# Patient Record
Sex: MALE | Race: WHITE | Employment: FULL TIME | URBAN - METROPOLITAN AREA
[De-identification: names, ages, dates, MRNs, and addresses within clinical notes are randomized per-mention and may not be internally consistent; named-entity substitution may affect disease eponyms.]

---

## 2017-02-10 ENCOUNTER — OFFICE VISIT (OUTPATIENT)
Dept: OCCUPATIONAL MEDICINE | Facility: CLINIC | Age: 72
End: 2017-02-10
Payer: OTHER MISCELLANEOUS

## 2017-02-10 PROCEDURE — 99203 OFFICE O/P NEW LOW 30 MIN: CPT

## 2022-01-05 ENCOUNTER — OFFICE VISIT (OUTPATIENT)
Dept: GASTROENTEROLOGY | Facility: CLINIC | Age: 77
End: 2022-01-05
Payer: MEDICARE

## 2022-01-05 VITALS
BODY MASS INDEX: 29.63 KG/M2 | DIASTOLIC BLOOD PRESSURE: 70 MMHG | SYSTOLIC BLOOD PRESSURE: 122 MMHG | HEIGHT: 70 IN | WEIGHT: 207 LBS | HEART RATE: 90 BPM

## 2022-01-05 DIAGNOSIS — Z12.11 SCREENING FOR COLON CANCER: Primary | ICD-10-CM

## 2022-01-05 DIAGNOSIS — Z71.89 ENCOUNTER FOR ANTICOAGULATION DISCUSSION AND COUNSELING: ICD-10-CM

## 2022-01-05 PROCEDURE — 99203 OFFICE O/P NEW LOW 30 MIN: CPT | Performed by: REGISTERED NURSE

## 2022-01-05 RX ORDER — CLOPIDOGREL BISULFATE 75 MG/1
75 TABLET ORAL DAILY
COMMUNITY

## 2022-01-05 RX ORDER — ATORVASTATIN CALCIUM 80 MG/1
80 TABLET, FILM COATED ORAL DAILY
COMMUNITY

## 2022-01-05 RX ORDER — METOPROLOL SUCCINATE 25 MG/1
25 TABLET, EXTENDED RELEASE ORAL DAILY
COMMUNITY

## 2022-01-05 NOTE — PROGRESS NOTES
[id number] - Outpatient Consultation  Savanna Saurez 77 y o  male MRN: [id number]  Encounter: [id number]    ASSESSMENT AND PLAN:      1  Screening for colon cancer  Last colonoscopy greater than 15 years ago  No polyps at that time  He is in doing Cologuard every 2-3 years since  2021 positive Cologuard which is why he is presenting for colonoscopy    2  Encounter for anticoagulation discussion and counseling  Patient is on Plavix for history of an MI  No stents placed  He will need to hold the medication 7 days prior to procedure  Patient understands the risks and benefits of holding medication including the risk of a small thromboembolic event and agrees to proceed given the approval by Dr López many      Followup Appointment: PRN  ______________________________________________________________________    Chief Complaint   Patient presents with    Positive cologuard, clearance for colonoscopy  Pt on Plavix       HPI:   Savanna Suarez is a 77y o  year old male who presents for clearance for colonoscopy  He is on Plavix for history of an MI 3 years ago  No stents placed at that time  No further heart attack, denies CVA, he is not diabetic, no sleep apnea  He denies any GI complaints  He moves his bowel regularly 1-2 times daily  Denies any blood  No abdominal pain  His appetite is good  No significant weight loss  No nausea, vomiting, GERD symptoms, dysphagia      Historical Information   Past Medical History:   Diagnosis Date    Heart attack ([address] )     Hyperlipidemia     Hypertension      Past Surgical History:   Procedure Laterality Date    ANKLE FRACTURE SURGERY      CATARACT EXTRACTION, BILATERAL      COLONOSCOPY      HERNIA REPAIR      ROTATOR CUFF REPAIR Bilateral      Social History     Substance and Sexual Activity   Alcohol Use Not Currently     Social History     Substance and Sexual Activity   Drug Use Not on file     Social History Tobacco Use   Smoking Status Never Smoker   Smokeless Tobacco Never Used     Family History   Problem Relation Age of Onset    Colon polyps Neg Hx     Colon cancer Neg Hx        Meds/Allergies     Current Outpatient Medications:     atorvastatin (LIPITOR) 80 mg tablet    clopidogrel (PLAVIX) 75 mg tablet    metoprolol succinate (TOPROL-XL) 25 mg 24 hr tablet    Probiotic Product (PROBIOTIC DAILY PO)    No Known Allergies    PHYSICAL EXAM:    Blood pressure 122/70, pulse 90, height 5' 10" (1 778 m), weight 93 9 kg (207 lb)  Body mass index is 29 7 kg/m²  General Appearance: NAD, cooperative, alert  Eyes: Anicteric, PERRLA, EOMI  ENT:  Normocephalic, atraumatic, normal mucosa  Neck:  Supple, symmetrical, trachea midline,   Resp:  Clear to auscultation bilaterally; no rales, rhonchi or wheezing; respirations unlabored   CV:  S1 S2, Regular rate and rhythm; no murmur, rub, or gallop  GI:  Soft, non-tender, non-distended; normal bowel sounds; no masses, no organomegaly   Rectal: Deferred  Musculoskeletal: No cyanosis, clubbing or edema  Normal ROM  Skin:  No jaundice, rashes, or lesions   Heme/Lymph: No palpable cervical lymphadenopathy  Psych: Normal affect, good eye contact  Neuro: No gross deficits, AAOx3    Lab Results:   No results found for: WBC, HGB, HCT, MCV, PLT  No results found for: NA, K, CL, CO2, ANIONGAP, BUN, CREATININE, GLUCOSE, GLUF, CALCIUM, CORRECTEDCA, AST, ALT, ALKPHOS, PROT, BILITOT, EGFR  No results found for: IRON, TIBC, FERRITIN  No results found for: LIPASE    Radiology Results:   No results found  REVIEW OF SYSTEMS:    CONSTITUTIONAL: Denies any fever, chills, rigors, and weight loss  HEENT: No earache or tinnitus  Denies hearing loss or visual disturbances  CARDIOVASCULAR: No chest pain or palpitations  RESPIRATORY: Denies any cough, hemoptysis, shortness of breath or dyspnea on exertion  GASTROINTESTINAL: As noted in the History of Present Illness     GENITOURINARY: No problems with urination  Denies any hematuria or dysuria  NEUROLOGIC: No dizziness or vertigo, denies headaches  MUSCULOSKELETAL: Denies any muscle or joint pain  SKIN: Denies skin rashes or itching  ENDOCRINE: Denies excessive thirst  Denies intolerance to heat or cold  PSYCHOSOCIAL: Denies depression or anxiety  Denies any recent memory loss

## 2022-01-05 NOTE — PATIENT INSTRUCTIONS
Scheduled date of colonoscopy (as of today): 1/25/22  Physician performing colonoscopy: Dr Robin Moreno  Location of colonoscopy: [facility]  Bowel prep reviewed with patient: Miralax  Instructions reviewed with patient by: Orion Muller  Clearances: none

## 2022-01-09 ENCOUNTER — PREP FOR PROCEDURE (OUTPATIENT)
Dept: GASTROENTEROLOGY | Facility: CLINIC | Age: 77
End: 2022-01-09

## 2022-01-09 DIAGNOSIS — Z12.11 SCREENING FOR COLON CANCER: Primary | ICD-10-CM

## 2022-01-10 ENCOUNTER — DOCUMENTATION (OUTPATIENT)
Dept: GASTROENTEROLOGY | Facility: CLINIC | Age: 77
End: 2022-01-10

## 2022-01-12 NOTE — PROGRESS NOTES
Advised patient to stop Plavix 7 days prior to procedure  Last dose will be 1/17/2022  Patient verbalized understanding

## 2022-01-25 ENCOUNTER — HOSPITAL ENCOUNTER (OUTPATIENT)
Dept: GASTROENTEROLOGY | Facility: AMBULATORY SURGERY CENTER | Age: 77
Discharge: HOME | End: 2022-01-25
Payer: MEDICARE

## 2022-01-25 ENCOUNTER — ANESTHESIA EVENT (OUTPATIENT)
Dept: GASTROENTEROLOGY | Facility: AMBULATORY SURGERY CENTER | Age: 77
End: 2022-01-25

## 2022-01-25 ENCOUNTER — ANESTHESIA (OUTPATIENT)
Dept: GASTROENTEROLOGY | Facility: AMBULATORY SURGERY CENTER | Age: 77
End: 2022-01-25

## 2022-01-25 VITALS
RESPIRATION RATE: 17 BRPM | DIASTOLIC BLOOD PRESSURE: 67 MMHG | TEMPERATURE: 97.1 F | SYSTOLIC BLOOD PRESSURE: 137 MMHG | OXYGEN SATURATION: 94 % | HEART RATE: 68 BPM

## 2022-01-25 DIAGNOSIS — K63.89 COLONIC MASS: Primary | ICD-10-CM

## 2022-01-25 DIAGNOSIS — Z12.11 SCREENING FOR COLON CANCER: ICD-10-CM

## 2022-01-25 PROCEDURE — 45380 COLONOSCOPY AND BIOPSY: CPT | Performed by: INTERNAL MEDICINE

## 2022-01-25 PROCEDURE — 88341 IMHCHEM/IMCYTCHM EA ADD ANTB: CPT | Performed by: PATHOLOGY

## 2022-01-25 PROCEDURE — 88342 IMHCHEM/IMCYTCHM 1ST ANTB: CPT | Performed by: PATHOLOGY

## 2022-01-25 PROCEDURE — 88305 TISSUE EXAM BY PATHOLOGIST: CPT | Performed by: PATHOLOGY

## 2022-01-25 PROCEDURE — 45381 COLONOSCOPY SUBMUCOUS NJX: CPT | Performed by: INTERNAL MEDICINE

## 2022-01-25 RX ORDER — PROPOFOL 10 MG/ML
INJECTION, EMULSION INTRAVENOUS AS NEEDED
Status: DISCONTINUED | OUTPATIENT
Start: 2022-01-25 | End: 2022-01-25

## 2022-01-25 RX ORDER — GLYCOPYRROLATE 0.2 MG/ML
INJECTION INTRAMUSCULAR; INTRAVENOUS AS NEEDED
Status: DISCONTINUED | OUTPATIENT
Start: 2022-01-25 | End: 2022-01-25

## 2022-01-25 RX ORDER — SODIUM CHLORIDE, SODIUM LACTATE, POTASSIUM CHLORIDE, CALCIUM CHLORIDE 600; 310; 30; 20 MG/100ML; MG/100ML; MG/100ML; MG/100ML
50 INJECTION, SOLUTION INTRAVENOUS CONTINUOUS
Status: DISCONTINUED | OUTPATIENT
Start: 2022-01-25 | End: 2022-01-29 | Stop reason: HOSPADM

## 2022-01-25 RX ADMIN — PROPOFOL 50 MG: 10 INJECTION, EMULSION INTRAVENOUS at 13:15

## 2022-01-25 RX ADMIN — PROPOFOL 50 MG: 10 INJECTION, EMULSION INTRAVENOUS at 13:00

## 2022-01-25 RX ADMIN — SODIUM CHLORIDE, SODIUM LACTATE, POTASSIUM CHLORIDE, CALCIUM CHLORIDE 50 ML/HR: 600; 310; 30; 20 INJECTION, SOLUTION INTRAVENOUS at 12:43

## 2022-01-25 RX ADMIN — PROPOFOL 50 MG: 10 INJECTION, EMULSION INTRAVENOUS at 13:06

## 2022-01-25 RX ADMIN — GLYCOPYRROLATE 0.2 MG: 0.2 INJECTION INTRAMUSCULAR; INTRAVENOUS at 13:22

## 2022-01-25 RX ADMIN — PROPOFOL 100 MG: 10 INJECTION, EMULSION INTRAVENOUS at 12:58

## 2022-01-25 NOTE — H&P
History and Physical -  [facility]  Lucinda Hamilton 77 y o  male MRN: [id number]    HPI: Lucinda Hamilton is a 77y o  year old male who presents for colonoscopy for positive Cologuard    REVIEW OF SYSTEMS: Per the HPI, and otherwise unremarkable  Historical Information   Past Medical History:   Diagnosis Date    [address] [facility] [address] [address] )     Hyperlipidemia     Hypertension     Myocardial infarction [facility])      Past Surgical History:   Procedure Laterality Date    ANKLE FRACTURE SURGERY      CATARACT EXTRACTION      CATARACT EXTRACTION, BILATERAL      COLONOSCOPY      HERNIA REPAIR      ROTATOR CUFF REPAIR Bilateral      Social History   Social History     Substance and Sexual Activity   Alcohol Use Not Currently     Social History     Substance and Sexual Activity   Drug Use Never     Social History     Tobacco Use   Smoking Status Never Smoker   Smokeless Tobacco Never Used     Family History   Problem Relation Age of Onset    Colon polyps Neg Hx     Colon cancer Neg Hx        Meds/Allergies       Current Outpatient Medications:     atorvastatin (LIPITOR) 80 mg tablet    clopidogrel (PLAVIX) 75 mg tablet    metoprolol succinate (TOPROL-XL) 25 mg 24 hr tablet    Probiotic Product (PROBIOTIC DAILY PO)    Current Facility-Administered Medications:     lactated ringers infusion, 50 mL/hr, Intravenous, Continuous, Stopped at 01/25/22 1321    No Known Allergies    Objective     /68   Pulse 63   Temp (!) 97 1 °F (36 2 °C) (Temporal)   Resp 17   SpO2 94%     PHYSICAL EXAM    Gen: NAD AAOx3  Head: Normocephalic, Atraumatic  CV: S1S2 RRR no m/r/g  CHEST: Clear b/l no c/r/w  ABD: soft, +BS NT/ND  EXT: no edema    ASSESSMENT/PLAN:  This is a 77y o  year old male here for colonoscopy, and he is stable and optimized for his procedure

## 2022-01-25 NOTE — ANESTHESIA POSTPROCEDURE EVALUATION
Post-Op Assessment Note    CV Status:  Stable  Pain Score: 0    Pain management: adequate     Mental Status:  Alert and awake   Hydration Status:  Euvolemic and stable   PONV Controlled:  None   Airway Patency:  Patent      Post Op Vitals Reviewed: Yes      Staff: CRNA         No complications documented      /80 (01/25/22 1324)    Temp     Pulse 68 (01/25/22 1324)   Resp 18 (01/25/22 1324)    SpO2 95 % (01/25/22 1324)

## 2022-01-25 NOTE — PROGRESS NOTES
Pt c/o 4/10 pain on R side which is constant  Pt repositioned and assisted to BR to see if able to pass air

## 2022-01-25 NOTE — PROGRESS NOTES
Pt's pain improve 2/10 but instructed that if it gets worse to call number given  Dr Juancarlos Holliday spoke with pt and daughter to review results and at bedside to hear d/c instructions  Pt given script for CT scan and name for referring doctor

## 2022-01-25 NOTE — ANESTHESIA PREPROCEDURE EVALUATION
Procedure:  COLONOSCOPY    Relevant Problems   CARDIO   (+) Heart attack (HCC)   (+) Hyperlipidemia   (+) Hypertension        Physical Exam    Airway    Mallampati score: II  TM Distance: >3 FB  Neck ROM: full     Dental   No notable dental hx     Cardiovascular  Cardiovascular exam normal    Pulmonary  Pulmonary exam normal     Other Findings        Anesthesia Plan  ASA Score- 3     Anesthesia Type- IV sedation with anesthesia with ASA Monitors  Additional Monitors:   Airway Plan:           Plan Factors-    Chart reviewed  Patient is not a current smoker  Induction- intravenous  Postoperative Plan-     Informed Consent- Anesthetic plan and risks discussed with patient  I personally reviewed this patient with the CRNA  Discussed and agreed on the Anesthesia Plan with the CRNA  Houston Arechiga

## 2022-01-28 ENCOUNTER — TELEPHONE (OUTPATIENT)
Dept: GASTROENTEROLOGY | Facility: CLINIC | Age: 77
End: 2022-01-28

## 2022-01-28 NOTE — RESULT ENCOUNTER NOTE
Spoke with patient biopsy positive for CA as expected in the colon  He has a CT scan scheduled for next week  Please reach out to him with Dr Noble from [facility] number he understands this needs to be removed surgically  One year colonoscopy recall    thanks

## 2022-01-28 NOTE — TELEPHONE ENCOUNTER
Forwarded:  Sergey Armstrong([facility])   Pt: Bc Bookbinder ( 47) with a colon mass, it is Chema Guillen, pathology

## 2022-02-03 ENCOUNTER — HOSPITAL ENCOUNTER (OUTPATIENT)
Dept: CT IMAGING | Facility: HOSPITAL | Age: 77
Discharge: HOME | End: 2022-02-03
Attending: INTERNAL MEDICINE
Payer: MEDICARE

## 2022-02-03 DIAGNOSIS — K63.89 COLONIC MASS: ICD-10-CM

## 2022-02-03 PROCEDURE — 74177 CT ABD & PELVIS W/CONTRAST: CPT

## 2022-02-03 PROCEDURE — G1004 CDSM NDSC: HCPCS

## 2022-02-03 RX ADMIN — IOHEXOL 100 ML: 350 INJECTION, SOLUTION INTRAVENOUS at 14:45

## 2022-02-03 NOTE — LETTER
[address] [place] [address]  [address] [facility] [address]      February 14, 2022    MRN: [id number]     Phone: [phone]     Dear Mr Tom Patel recently had a(n) Cat Scan performed on 2/3/2022 at  [address] [place] [address] that was requested by Tahira Albert MD  The study was reviewed by a radiologist, which is a physician who specializes in medical imaging  The radiologist issued a report describing his or her findings  In that report there was a finding that the radiologist felt warranted further discussion with your health care provider and that discussion would be beneficial to you  The results were sent to Tahira Albert MD on 02/08/2022 10:42 AM  We recommend that you contact Tahira Albert MD at [phone] or set up an appointment to discuss the results of the imaging test  If you have already heard from Tahira Albert MD regarding the results of your study, you can disregard this letter  This letter is not meant to alarm you, but intended to encourage you to follow-up on your results with the provider that sent you for the imaging study  In addition, we have enclosed answers to frequently asked questions by other patients who have also received a letter to review results with their health care provider (see page two)  Thank you for choosing [address] [place] [address] for your medical imaging needs  FREQUENTLY ASKED QUESTIONS    1  Why am I receiving this letter? [facility] requires us to notify patients who have findings on imaging exams that may require more testing or follow-up with a health professional within the next 3 months          2  How serious is the finding on the imaging test?  This letter is sent to all patients who may need follow-up or more testing within the next 3 months  Receiving this letter does not necessarily mean you have a life-threatening imaging finding or disease  Recommendations in the radiologists imaging report are general in nature and it is up to your healthcare provider to say whether those recommendations make sense for your situation  You are strongly encouraged to talk to your health care provider about the results and ask whether additional steps need to be taken  3  Where can I get a copy of the final report for my recent radiology exam?  To get a full copy of the report you can access your records online at [email or web address] or please contact [address] [place] [address] Records Department at [phone] Monday through Friday between 8 am and 6 pm          4  What do I need to do now? Please contact your health care provider who requested the imaging study to discuss what further actions (if any) are needed  You may have already heard from (your ordering provider) in regard to this test in which case you can disregard this letter  NOTICE IN ACCORDANCE WITH THE [facility] PATIENT TEST RESULT INFORMATION ACT OF 2018    You are receiving this notice as a result of a determination by your diagnostic imaging service that further discussions of your test results are warranted and would be beneficial to you  The complete results of your test or tests have been or will be sent to the health care practitioner that ordered the test or tests  It is recommended that you contact your health care practitioner to discuss your results as soon as possible

## 2022-02-07 NOTE — TELEPHONE ENCOUNTER
Pt called questioning results of his CT scan from 2/3  Noted in system, exam ended  Pt notified it is not completed yet  Pt has an apt with Dr Karena Bowden on 2/9 so requested once completed to please fax to Dr Karena Bowden as well as Dr Darwin James  Called [facility]'s reading room and asked it be finished before 2/9

## 2022-02-08 NOTE — TELEPHONE ENCOUNTER
[facility]   Significant findings for your pt Sofiatenzin Violette" on his CT abdomen pelvis  The report is in [place]

## 2022-02-08 NOTE — RESULT ENCOUNTER NOTE
Spoke with patient    To me this looks negative for metastatic disease has appointment with Dr Niharika Zambrano tomorrow which I encouraged him to keep

## 2022-02-09 ENCOUNTER — TELEPHONE (OUTPATIENT)
Dept: GASTROENTEROLOGY | Facility: CLINIC | Age: 77
End: 2022-02-09

## 2022-02-09 NOTE — TELEPHONE ENCOUNTER
Elli from [facility] for Dr Sharri Gutierrez left  mssg stating Pt is being seen this afternoon; urgent med rec request for records to be sent to [phone]  No CB#  Fax'd records  Confirmation rec'd  Alicia Gomez called again requesting records  Conf'd receipt of previous mssg/stated records were sent and confirmation rec'd  She checked fax/records rec'd

## 2022-12-29 ENCOUNTER — TELEPHONE (OUTPATIENT)
Dept: GASTROENTEROLOGY | Facility: AMBULARY SURGERY CENTER | Age: 77
End: 2022-12-29

## 2022-12-29 NOTE — TELEPHONE ENCOUNTER
12/29/22  Screened by: Ana Santos    Referring Provider     Pre- Screening:     There is no height or weight on file to calculate BMI.  Has patient been referred for a routine screening Colonoscopy? yes  Is the patient between 45-75 years old? yes      Previous Colonoscopy yes   If yes:    Date: 1/2022    [facility]    Reason:       SCHEDULING STAFF: If the patient is between 45yrs-49yrs, please advise patient to confirm benefits/coverage with their insurance company for a routine screening colonoscopy, some insurance carriers will only cover at 50yrs or older. If the patient is over 75years old, please schedule an office visit.     Does the patient want to see a Gastroenterologist prior to their procedure OR are they having any GI symptoms? no    Has the patient been hospitalized or had abdominal surgery in the past 6 months? no    Does the patient use supplemental oxygen? no    Does the patient take Coumadin, Lovenox, Plavix, Elliquis, Xarelto, or other blood thinning medication? yes    Has the patient had a stroke, cardiac event, or stent placed in the past year? no    SCHEDULING STAFF: If patient answers NO to above questions, then schedule procedure. If patient answers YES to above questions, then schedule office appointment.     If patient is between 45yrs - 49yrs, please advise patient that we will have to confirm benefits & coverage with their insurance company for a routine screening colonoscopy.      Pt currently on plavix, states that he was on it when he had the last one, call back # [phone]

## 2023-02-06 ENCOUNTER — TELEPHONE (OUTPATIENT)
Dept: GASTROENTEROLOGY | Facility: CLINIC | Age: 78
End: 2023-02-06

## 2023-02-06 ENCOUNTER — OFFICE VISIT (OUTPATIENT)
Dept: GASTROENTEROLOGY | Facility: CLINIC | Age: 78
End: 2023-02-06

## 2023-02-06 VITALS
SYSTOLIC BLOOD PRESSURE: 136 MMHG | BODY MASS INDEX: 28.72 KG/M2 | HEIGHT: 70 IN | WEIGHT: 200.6 LBS | DIASTOLIC BLOOD PRESSURE: 68 MMHG

## 2023-02-06 DIAGNOSIS — C18.7 MALIGNANT NEOPLASM OF SIGMOID COLON (HCC): ICD-10-CM

## 2023-02-06 DIAGNOSIS — Z85.038 HISTORY OF COLON CANCER: Primary | ICD-10-CM

## 2023-02-06 DIAGNOSIS — Z79.01 LONG TERM CURRENT USE OF ANTICOAGULANT: ICD-10-CM

## 2023-02-06 NOTE — PROGRESS NOTES
[facility] - Outpatient Follow-up Note  Carol Kidd 69 y o  male MRN: [id number]  Encounter: [id number]    ASSESSMENT AND PLAN:      1  History of colon cancer  2  Long term current use of anticoagulant  3  Malignant neoplasm of sigmoid colon [facility])  Patient with a history of A-fib on Eliquis and colon adenocarcinoma T2N0 of the rectosigmoid status post resection with colorectal surgery  Currently due for repeat colonoscopy for history of colon cancer  Patient to hold Eliquis for 2 days prior to his procedure  We will schedule the patient for colonoscopy and have advised the patient to take the bowel preparation in a split dose bowel preparation  I have discussed with the patient the risks and benefits and alternatives of the procedure which include but are not limited to bleeding, infection, aspiration, perforation  Follow up appointment: For colonoscopy  ______________________________________________________________________    Chief Complaint   Patient presents with   • Follow up from colon surgery   • Schedule Colonoscopy     HPI:   Patient is a 71-year-old male past medical history of A-fib on Eliquis, colon cancer status postresection in 2022,  Presenting for follow-up regarding colon cancer history  Last colonoscopy was in January 2022 with rectosigmoid mass that was positive for cancer  Status post resection with colorectal surgery          Historical Information   Past Medical History:   Diagnosis Date   • Heart attack ([facility] [address] )    • Hyperlipidemia    • Hypertension    • Malignant neoplasm of sigmoid colon ([facility] [address] ) 2/6/2023   • Myocardial infarction [facility])      Past Surgical History:   Procedure Laterality Date   • ANKLE FRACTURE SURGERY     • CATARACT EXTRACTION     • CATARACT EXTRACTION, BILATERAL     • COLON SURGERY     • COLONOSCOPY     • HERNIA REPAIR     • ROTATOR CUFF REPAIR Bilateral      Social History     Substance and Sexual Activity   Alcohol Use Not Currently     Social History     Substance and Sexual Activity   Drug Use Never     Social History     Tobacco Use   Smoking Status Never   Smokeless Tobacco Never     Family History   Problem Relation Age of Onset   • Colon polyps Neg Hx    • Colon cancer Neg Hx          Current Outpatient Medications:   •  apixaban (ELIQUIS) 5 mg  •  atorvastatin (LIPITOR) 80 mg tablet  •  metoprolol succinate (TOPROL-XL) 25 mg 24 hr tablet  •  Probiotic Product (PROBIOTIC DAILY PO)  No Known Allergies  Reviewed medications and allergies and updated as indicated    PHYSICAL EXAM:    Blood pressure 136/68, height 5' 10" (1 778 m), weight 91 kg (200 lb 9 6 oz)  Body mass index is 28 78 kg/m²  General Appearance: NAD, cooperative, alert  Eyes: Anicteric  GI:  Soft, non-tender, non-distended; normal bowel sounds; no masses, no organomegaly   Rectal: Deferred  Musculoskeletal: No edema  Skin:  No jaundice    Lab Results:   No results found for: WBC, HGB, MCV, PLT  No results found for: NA, K, CL, CO2, ANIONGAP, BUN, CREATININE, GLUCOSE, GLUF, CALCIUM, CORRECTEDCA, AST, ALT, ALKPHOS, PROT, BILITOT, EGFR  No results found for: IRON, TIBC, FERRITIN  No results found for: LIPASE    Radiology Results:   No results found

## 2023-02-06 NOTE — TELEPHONE ENCOUNTER
Scheduled date of colonoscopy (as of today):3-28 23Physician performing colonoscopy: [address]  Location of colonoscopy: [address] [place] [address]  Bowel prep reviewed with patient: Danae Tom  Instructions reviewed with patient by:ju zamora   Clearances: yes Eliquis

## 2023-03-14 ENCOUNTER — TELEPHONE (OUTPATIENT)
Dept: GASTROENTEROLOGY | Facility: CLINIC | Age: 78
End: 2023-03-14

## 2023-03-14 NOTE — TELEPHONE ENCOUNTER
X-1 message sent to patient re colonoscopy/eliquis hold  Last dose 3/25/2023  Miralax/dulcolax prep

## 2023-03-28 ENCOUNTER — ANESTHESIA EVENT (OUTPATIENT)
Dept: GASTROENTEROLOGY | Facility: AMBULATORY SURGERY CENTER | Age: 78
End: 2023-03-28

## 2023-03-28 ENCOUNTER — HOSPITAL ENCOUNTER (OUTPATIENT)
Dept: GASTROENTEROLOGY | Facility: AMBULATORY SURGERY CENTER | Age: 78
Discharge: HOME | End: 2023-03-28
Attending: INTERNAL MEDICINE

## 2023-03-28 ENCOUNTER — ANESTHESIA (OUTPATIENT)
Dept: GASTROENTEROLOGY | Facility: AMBULATORY SURGERY CENTER | Age: 78
End: 2023-03-28

## 2023-03-28 VITALS
RESPIRATION RATE: 17 BRPM | SYSTOLIC BLOOD PRESSURE: 145 MMHG | TEMPERATURE: 96.1 F | OXYGEN SATURATION: 96 % | BODY MASS INDEX: 28.63 KG/M2 | HEIGHT: 70 IN | HEART RATE: 77 BPM | DIASTOLIC BLOOD PRESSURE: 72 MMHG | WEIGHT: 200 LBS

## 2023-03-28 DIAGNOSIS — Z85.038 HISTORY OF COLON CANCER: ICD-10-CM

## 2023-03-28 RX ORDER — PROPOFOL 10 MG/ML
INJECTION, EMULSION INTRAVENOUS CONTINUOUS PRN
Status: DISCONTINUED | OUTPATIENT
Start: 2023-03-28 | End: 2023-03-28

## 2023-03-28 RX ORDER — PROPOFOL 10 MG/ML
INJECTION, EMULSION INTRAVENOUS AS NEEDED
Status: DISCONTINUED | OUTPATIENT
Start: 2023-03-28 | End: 2023-03-28

## 2023-03-28 RX ORDER — SODIUM CHLORIDE, SODIUM LACTATE, POTASSIUM CHLORIDE, CALCIUM CHLORIDE 600; 310; 30; 20 MG/100ML; MG/100ML; MG/100ML; MG/100ML
50 INJECTION, SOLUTION INTRAVENOUS CONTINUOUS
Status: DISCONTINUED | OUTPATIENT
Start: 2023-03-28 | End: 2023-04-01 | Stop reason: HOSPADM

## 2023-03-28 RX ADMIN — SODIUM CHLORIDE, SODIUM LACTATE, POTASSIUM CHLORIDE, CALCIUM CHLORIDE 50 ML/HR: 600; 310; 30; 20 INJECTION, SOLUTION INTRAVENOUS at 09:11

## 2023-03-28 RX ADMIN — PROPOFOL 100 MG: 10 INJECTION, EMULSION INTRAVENOUS at 09:20

## 2023-03-28 RX ADMIN — PROPOFOL 100 MCG/KG/MIN: 10 INJECTION, EMULSION INTRAVENOUS at 09:20

## 2023-03-28 NOTE — ANESTHESIA PREPROCEDURE EVALUATION
Procedure:  COLONOSCOPY    Relevant Problems   CARDIO   (+) Heart attack ([address] )   (+) Hyperlipidemia   (+) Hypertension      GI/HEPATIC   (+) Malignant neoplasm of sigmoid colon (HCC)        Physical Exam    Airway    Mallampati score: II  TM Distance: >3 FB  Neck ROM: full     Dental   upper dentures,     Cardiovascular      Pulmonary      Other Findings        Anesthesia Plan  ASA Score- 3     Anesthesia Type- IV sedation with anesthesia with ASA Monitors  Additional Monitors:   Airway Plan:           Plan Factors-    Chart reviewed  Patient summary reviewed  Patient is not a current smoker  Induction- intravenous  Postoperative Plan-     Informed Consent- Anesthetic plan and risks discussed with patient  I personally reviewed this patient with the CRNA  Discussed and agreed on the Anesthesia Plan with the CRNA  Silverio Alfredo

## 2023-03-28 NOTE — PROGRESS NOTES
Dr Stacy Crawley stated for pt to start Eliquis tomorrow  Pt made aware  Pt verbalized understanding

## 2023-03-28 NOTE — H&P
"History and Physical -  [facility]  Mitzi Simmons 69 y o  male MRN: [id number]    HPI: Mitzi Simmons is a 69y o  year old male who presents for colonoscopy for history of colon cancer  Patient had his last colonoscopy in January 2022 with the finding of rectosigmoid adenocarcinoma  Status post colorectal surgery  REVIEW OF SYSTEMS: Per the HPI, and otherwise unremarkable      Historical Information   Past Medical History:   Diagnosis Date   • Heart attack ([facility] )     2018   • Hyperlipidemia    • Hypertension    • Malignant neoplasm of sigmoid colon ([facility] ) 02/06/2023   • Myocardial infarction [facility])      Past Surgical History:   Procedure Laterality Date   • ANKLE FRACTURE SURGERY     • CATARACT EXTRACTION     • CATARACT EXTRACTION, BILATERAL     • COLON SURGERY     • COLONOSCOPY     • HERNIA REPAIR     • ROTATOR CUFF REPAIR Bilateral    • US GUIDED THYROID BIOPSY       Social History   Social History     Substance and Sexual Activity   Alcohol Use Not Currently     Social History     Substance and Sexual Activity   Drug Use Never     Social History     Tobacco Use   Smoking Status Never   Smokeless Tobacco Never     Family History   Problem Relation Age of Onset   • Colon polyps Neg Hx    • Colon cancer Neg Hx        Meds/Allergies       Current Outpatient Medications:   •  atorvastatin (LIPITOR) 80 mg tablet  •  apixaban (ELIQUIS) 5 mg  •  metoprolol succinate (TOPROL-XL) 25 mg 24 hr tablet  •  Probiotic Product (PROBIOTIC DAILY PO)    Current Facility-Administered Medications:   •  lactated ringers infusion, 50 mL/hr, Intravenous, Continuous, Continue from Pre-op at 03/28/23 0911    No Known Allergies    Objective     /81   Pulse 86   Temp (!) 96 1 °F (35 6 °C) (Temporal)   Resp 17   Ht 5' 10\" (1 778 m)   Wt 90 7 kg (200 lb)   SpO2 96%   BMI 28 70 kg/m²     PHYSICAL EXAM    Gen: NAD AAOx3  Head: Normocephalic, Atraumatic  CV: S1S2 RRR no m/r/g  CHEST: Clear b/l no " c/r/w  ABD: soft, +BS NT/ND  EXT: no edema    ASSESSMENT/PLAN:  This is a 69y o  year old male here for colonoscopy, and he is stable and optimized for his procedure

## 2023-10-13 ENCOUNTER — HOSPITAL ENCOUNTER (OUTPATIENT)
Dept: RADIOLOGY | Age: 78
Discharge: HOME | End: 2023-10-13

## 2023-10-13 DIAGNOSIS — C73 MALIGNANT NEOPLASM OF THYROID GLAND (HCC): ICD-10-CM

## 2023-11-13 ENCOUNTER — HOSPITAL ENCOUNTER (OUTPATIENT)
Dept: NUCLEAR MEDICINE | Facility: HOSPITAL | Age: 78
Discharge: HOME | End: 2023-11-13
Payer: MEDICARE

## 2023-11-13 DIAGNOSIS — C73 THYROID CANCER (HCC): ICD-10-CM

## 2023-11-13 PROCEDURE — 78018 THYROID MET IMAGING BODY: CPT

## 2023-11-13 PROCEDURE — A9509 IODINE I-123 SOD IODIDE MIL: HCPCS

## 2023-11-13 PROCEDURE — 96372 THER/PROPH/DIAG INJ SC/IM: CPT

## 2023-11-13 RX ADMIN — THYROTROPIN ALFA 0.9 MG: 0.9 INJECTION, POWDER, FOR SOLUTION INTRAMUSCULAR at 08:12

## 2023-11-14 ENCOUNTER — HOSPITAL ENCOUNTER (OUTPATIENT)
Dept: NUCLEAR MEDICINE | Facility: HOSPITAL | Age: 78
Discharge: HOME | End: 2023-11-14
Payer: MEDICARE

## 2023-11-14 RX ADMIN — THYROTROPIN ALFA 0.9 MG: 0.9 INJECTION, POWDER, FOR SOLUTION INTRAMUSCULAR at 08:01

## 2023-11-14 NOTE — NURSING NOTE
Pt arrived for day 2 thyrogen injection. He denies any side effects from day 1 thyrogen injection. Thyrogen injection procedure teaching reviewed with verbal understanding. See MAR for administration details. R buttock site clean and dry, pt tolerated well. Pt understands remainder of schedule and will return at 1130 for capsule administration.

## 2023-11-15 ENCOUNTER — APPOINTMENT (OUTPATIENT)
Dept: LAB | Age: 78
End: 2023-11-15
Payer: MEDICARE

## 2023-11-15 ENCOUNTER — HOSPITAL ENCOUNTER (OUTPATIENT)
Dept: RADIOLOGY | Age: 78
Discharge: HOME | End: 2023-11-15
Payer: MEDICARE

## 2023-11-15 ENCOUNTER — HOSPITAL ENCOUNTER (OUTPATIENT)
Dept: NUCLEAR MEDICINE | Facility: HOSPITAL | Age: 78
Discharge: HOME | End: 2023-11-15
Payer: MEDICARE

## 2023-11-15 DIAGNOSIS — C73 THYROID CANCER (HCC): ICD-10-CM

## 2023-11-15 PROCEDURE — 86800 THYROGLOBULIN ANTIBODY: CPT

## 2023-11-15 PROCEDURE — 79005 NUCLEAR RX ORAL ADMIN: CPT

## 2023-11-15 PROCEDURE — 84432 ASSAY OF THYROGLOBULIN: CPT

## 2023-11-15 PROCEDURE — A9517 I131 IODIDE CAP, RX: HCPCS

## 2023-11-16 LAB
THYROGLOB AB SERPL-ACNC: <1 IU/ML (ref 0–0.9)
THYROGLOB SERPL-MCNC: 0.9 NG/ML (ref 1.4–29.2)

## 2023-11-20 ENCOUNTER — HOSPITAL ENCOUNTER (OUTPATIENT)
Dept: NUCLEAR MEDICINE | Facility: HOSPITAL | Age: 78
Discharge: HOME | End: 2023-11-20
Payer: MEDICARE

## 2023-11-20 DIAGNOSIS — C73 MALIGNANT NEOPLASM OF THYROID GLAND (HCC): ICD-10-CM

## 2023-11-20 PROCEDURE — 78018 THYROID MET IMAGING BODY: CPT

## 2025-08-07 DIAGNOSIS — Z96.652 H/O TOTAL KNEE REPLACEMENT, LEFT: Primary | ICD-10-CM

## 2025-08-07 RX ORDER — AMOXICILLIN 500 MG/1
CAPSULE ORAL
Qty: 4 CAPSULE | Refills: 2 | Status: SHIPPED | OUTPATIENT
Start: 2025-08-07 | End: 2026-08-07

## 2025-08-07 RX ORDER — AMOXICILLIN 500 MG/1
CAPSULE ORAL
Qty: 4 CAPSULE | Refills: 0 | Status: CANCELLED | OUTPATIENT
Start: 2025-08-07